# Patient Record
Sex: MALE | Race: WHITE
[De-identification: names, ages, dates, MRNs, and addresses within clinical notes are randomized per-mention and may not be internally consistent; named-entity substitution may affect disease eponyms.]

---

## 2022-07-17 NOTE — XMS
PreManage Notification: NICKOLAS IRENE MRN:[id number]
 
Security Information
 
Security Events
No recent Security Events currently on file
 
 
 
CRITERIA MET
------------
- [facility] - 2 Visits in 30 Days
 
 
CARE PROVIDERS
-------------------------------------------------------------------------------------
[facility],     Clinic/Center: Dental     Current
INC.
 
PHONE: Unknown
-------------------------------------------------------------------------------------
Winnie Torres     Nurse Practitioner: Family     Current
 
PHONE: [phone]
-------------------------------------------------------------------------------------
Laure Rodriguez      Physician Assistant     Current

 
PHONE: [phone]
-------------------------------------------------------------------------------------
 
Sandrita has no Care Guidelines for this patient.
 
E.SHERI VISIT COUNT (12 MO.)
-------------------------------------------------------------------------------------
[address] [address] [address] [facility]
-------------------------------------------------------------------------------------
TOTAL 3
-------------------------------------------------------------------------------------
NOTE: Visits indicate total known visits.
 
ED/UCC VISIT TRACKING (12 MO.)
-------------------------------------------------------------------------------------
07/17/2022 07:13
[facility]
 
TYPE: Emergency
 
COMPLAINT:
- SKIN PROBLEM
-------------------------------------------------------------------------------------
06/30/2022 13:50
[facility] [place]
 
TYPE: Emergency
 
COMPLAINT:
 
- MEDICAL CLEARANCE
 
DIAGNOSES:
- Post-traumatic stress disorder, unspecified
- Other stimulant abuse, uncomplicated
-------------------------------------------------------------------------------------
12/07/2021 13:52
[facility]
 
TYPE: Emergency
 
DIAGNOSES:
- Other stimulant abuse, uncomplicated
- Restlessness and agitation
- Abnormal levels of other serum enzymes
- Drug Problem
-------------------------------------------------------------------------------------
 
 
INPATIENT VISIT TRACKING (12 MO.)
No inpatient visits to display in this time frame
 
[email or web address]

## 2022-07-30 ENCOUNTER — HOSPITAL ENCOUNTER (OUTPATIENT)
Dept: HOSPITAL 46 - ED | Age: 32
Setting detail: OBSERVATION
LOS: 2 days | Discharge: HOME | End: 2022-08-01
Attending: SURGERY | Admitting: SURGERY
Payer: MEDICAID

## 2022-07-30 VITALS — HEIGHT: 66 IN | WEIGHT: 179.46 LBS | BODY MASS INDEX: 28.84 KG/M2

## 2022-07-30 DIAGNOSIS — F15.10: ICD-10-CM

## 2022-07-30 DIAGNOSIS — F43.10: ICD-10-CM

## 2022-07-30 DIAGNOSIS — Z20.822: ICD-10-CM

## 2022-07-30 DIAGNOSIS — K35.33: Primary | ICD-10-CM
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
## 2022-07-30 PROCEDURE — U0003 INFECTIOUS AGENT DETECTION BY NUCLEIC ACID (DNA OR RNA); SEVERE ACUTE RESPIRATORY SYNDROME CORONAVIRUS 2 (SARS-COV-2) (CORONAVIRUS DISEASE [COVID-19]), AMPLIFIED PROBE TECHNIQUE, MAKING USE OF HIGH THROUGHPUT TECHNOLOGIES AS DESCRIBED BY CMS-2020-01-R: HCPCS

## 2022-07-30 PROCEDURE — G0378 HOSPITAL OBSERVATION PER HR: HCPCS

## 2022-07-30 PROCEDURE — A9270 NON-COVERED ITEM OR SERVICE: HCPCS

## 2022-07-30 PROCEDURE — C9803 HOPD COVID-19 SPEC COLLECT: HCPCS

## 2022-07-30 NOTE — XMS
PreManage Notification: NICKOLAS IRENE MRN:[id number]
 
Security Information
 
Security Events
No recent Security Events currently on file
 
 
 
CRITERIA MET
------------
- [facility] - 2 Visits in 30 Days
 
 
CARE PROVIDERS
-------------------------------------------------------------------------------------
[facility],     Clinic/Center: Dental     Current
INC.
 
PHONE: Unknown
-------------------------------------------------------------------------------------
Winnie Torres     Nurse Practitioner: Family     Current
 
PHONE: [phone]
-------------------------------------------------------------------------------------
Laure Rodriguez      Physician Assistant     Current

 
PHONE: [phone]
-------------------------------------------------------------------------------------
 
Sandrita has no Care Guidelines for this patient.
 
E.SHERI VISIT COUNT (12 MO.)
-------------------------------------------------------------------------------------
[address] [address] [facility]
-------------------------------------------------------------------------------------
TOTAL 4
-------------------------------------------------------------------------------------
NOTE: Visits indicate total known visits.
 
ED/UCC VISIT TRACKING (12 MO.)
-------------------------------------------------------------------------------------
07/30/2022 20:50
[facility] [place]
 
TYPE: Emergency
 
COMPLAINT:
- ABD PAIN
-------------------------------------------------------------------------------------
07/17/2022 07:13
[facility]
 
TYPE: Emergency
 
COMPLAINT:
 
- SKIN PROBLEM
 
DIAGNOSES:
- Scabies
- Pruritus, unspecified
-------------------------------------------------------------------------------------
06/30/2022 13:50
[facility] [place]
 
TYPE: Emergency
 
COMPLAINT:
- MEDICAL CLEARANCE
 
DIAGNOSES:
- Post-traumatic stress disorder, unspecified
- Other stimulant abuse, uncomplicated
-------------------------------------------------------------------------------------
12/07/2021 13:52
[facility]
 
TYPE: Emergency
 
DIAGNOSES:
- Other stimulant abuse, uncomplicated
- Restlessness and agitation
- Abnormal levels of other serum enzymes
- Drug Problem
-------------------------------------------------------------------------------------
 
 
INPATIENT VISIT TRACKING (12 MO.)
No inpatient visits to display in this time frame
 
[email or web address]

## 2022-07-31 PROCEDURE — 0DTJ4ZZ RESECTION OF APPENDIX, PERCUTANEOUS ENDOSCOPIC APPROACH: ICD-10-PCS | Performed by: SURGERY

## 2022-07-31 NOTE — NUR
Patient has had uneventful shift. Arrived to floor C/O lower, mid abd pain.
Treated effectively w/4mg PRN morphine. Has been asleep since administration.
Vital signs stable. Remains on RA. Infusing LR at rate of 85 through IV in
RUE. Requested pt to call for assistance getting out of bed due to pain
medication use. No nausea or vomitting. Has remained NPO. Bed alarm in place.
Patient currently asleep with call light within reach.

## 2022-07-31 NOTE — NUR
VERBAL ORDER FOR LR BOLUS 1000ML TO INFUSE NOW, STARTED. SCDS IN PLACE FOR
SURGERY, PT DENEIS OTHER NEEDS AT THIS TIME.

## 2022-07-31 NOTE — NUR
PT WITH PAIN 8/10 WITH MOVEMENT, PAIN MEDICATION UNAVAILABLE AT THIS TIME.
CALL PLACED TO DR. PORRAS, VOICE MESSAGE LEFT FOR RETURN CALL. PT UP TO
BATHROOM, VOIDING WITHOUT EDIFFICULTY. ICE PACK PROVIDED FOR ABD. PT DENIES
OTHER NEEDS AT THIS TIME.

## 2022-07-31 NOTE — NUR
PATIENT BACK FROM SURGERY, VSS, SEEMS TO BE RESTING COMFORTABLY.  X3 LAP SITES
ARE INTACT WITH STERI STRIPS AND SCANT DRAINAGE.  SCD'S ARE ON AND PATIENT IS
SIPPING ON ICE WATER.  ASSESSMENT DONE.

## 2022-07-31 NOTE — NUR
PT RESTING IN BED. PT COMPLAINT OF PAIN 7/10 TO ABD, REQUESTING PAIN
MEDICATION, GIVEN 4MG IV MORPHINE. PT ALERT AND ORIENTED. PT ON ROOM AIR, LUNG
SOUNDS CLEAR, DENIES SOB. BOWEL TONES ACTIVE, DENIES NAUSEA AT THIS TIME, NPO
FOR SURGERY TODAY. CMS INTACT, WITHOUT EDEMA. IV FLUIDS INFUSING LR AT 85
ML/HR TO RIGHT UPPER ARM. CNA IN ROOM WITH PT TO COMPLETE SURGICAL WIPE DOWN.
DISCUSSED PLAN OF CARE FOR THE DAY, PT DENIES OTHER NEEDS AT THIS TIME.

## 2022-07-31 NOTE — NUR
PT ASSISTED TO BATHROOM AND BACK TO BED. PT WITH SMALL AMOUNT OF BLEEDING FROM
UMBILICAL LAP SITE, REINFORCED WITH GAUZE AND TAPE. VSS. PT TOLERATING DIET,
BOWEL TONES ACTIVE. PT CONTINUES TO HAVE PAIN, DOES NOT FEEL TORADOL WAS
HELPFUL.

## 2022-07-31 NOTE — NUR
Patient in bed resting comfortably. Denies needs at this time. Patient is
voiding. Patient having difficulty w/voids, but states this is normal for him.
Call light within reach.

## 2022-07-31 NOTE — NUR
PATIENT IN BED, VITALS WERE DONE BY NURSE MARQUIS, I/O'S COMPLETED. NO OTHER
NEEDS AT THIS TIME. CALL LIGHT IN REACH.

## 2022-07-31 NOTE — NUR
07/31/22 1210 Kerri Lange
1204- PT ARRIVES TO PACU REACTIVE TO VERBAL STIMULI. PT UPDATED THAT
HIS SURGERY IS OVER AND HE IS JUST WAKING UP. PT NODS HIS HEAD AND
FALLS BACK TO SLEEP. RESP EVEN AND UNLABORED. OXYGEN SAT HIGH 90'S TO
100% ON 6L VIA MASK.

## 2022-07-31 NOTE — NUR
PT HAD LAP APPY TODAY. PT ON ROOM AIR, LUNG SOUNDS CLEAR. PT PAIN INITIALLY
HIGH AFTER SURGERY, IMPOVING AFTER BUPRENORPHINE. PT TOLERATING REGULAR DIET,
BOWEL TONES ACTIVE. VOIDING WITHOUT DIFFICULTY. IV FLUIDS INFUSING LR AT
125ML/HR. LAP SITES X3, SMALL AMOUNT OF BLEEDING FROM UMBILICAL SITE,
REINFORCED WITH GAUZE.

## 2022-07-31 NOTE — NUR
PATIENT IN BED, PRE-OP [place] COMPLETE.VITALS AND I/O'S COMPLETE. COMPLAINTS
OF PAIN, NURSE MARQUIS IN ROOM FOR COMPLAINT. NO OTHER NEEDS AT THIS TIME.
CALL LIGHT WITHIN REACH.

## 2022-07-31 NOTE — NUR
Patient asleep in bed. LR running at rate of 85 through RUE IV. Bed alarm in
place. Call light within reach.

## 2022-07-31 NOTE — NUR
PT RESTING IN BED. PT RATING PAIN 7/10, REQUESTING PAIN MEDICATION, GIVEN IV
TORADOL PER ORDER. PT REQUESTING TO HAVE REGULAR FOOD, KITCHEN CALLED. BOWEL
TONES ACTIVE, DENIES NAUSEA. LAP SITE X3 WITH SCANT DRAINAGE, STERISTRIPS IN
PLACE. PT DENIES OTHER NEEDS AT THIS TIME.

## 2022-08-01 NOTE — NUR
PT SITTING UP IN CHAIR ON PHONE WITH TREATMENT CENTER. ADMINISTERED TYLENOL.
SAVING LUNCH FOR LATER AS HIS STOMACH HURTS RIGHT NOW. WILL CHECK LATER FOR A
WALK IN [place], PT AWARE HE NEEDS TO.

## 2022-08-01 NOTE — OR
[facility]
                                    [address], [place]  [address]
_________________________________________________________________________________________
                                                                 Signed   
 
 
DATE OF OPERATION:
07/31/2022
 
SURGEON:
Sahra Porras MD
 
PREOPERATIVE DIAGNOSES:
1. Acute appendicitis.
2. Substance abuse disorder.
 
POSTOPERATIVE DIAGNOSES:
1. Acute appendicitis.
2. Substance abuse disorder.
 
PROCEDURE:
Laparoscopic appendectomy.
 
ANESTHESIA:
1. General endotracheal, Luke Melvin, CRNA.  Preoperative TAP and rectus sheath block
(0.5% ropivacaine, 15 mg Precedex). 
2. Local anesthetic 20 mL of 0.25% Marcaine with epinephrine.
 
INDICATION:
This 31-year-old white man, who is a substance abuse rehab program locally and has
achieved 25 days of sobriety from methamphetamine, fentanyl, and heroin.  He is in a
90-day program.  For the past several days, he has had increasing abdominal pain,
crampiness, and so forth and developed rather severe right lower abdominal pain.  He
presented to the emergency room late last night, was evaluated and found to have
probable appendicitis.  His white count was elevated to 15.7.  A CT scan performed
confirmed acute appendicitis.  He has been treated with Unasyn __________ IV fluid
resuscitation and so forth and is now to undergo appendectomy, preferably by
laparoscopic approach.  He understands the risks of bleeding, infection, need for open
procedure and other unforeseen complications related to operation and wished to proceed. 
 
FINDINGS:
There is no sign of ascites or carcinomatosis.  The gallbladder was normal, though
distended.  The liver was normal.  The appendix was definitely inflamed acutely.  There
was no evidence of perforation or abscess.  Complete appendectomy was performed without
problem.  There was no sign of bleeding. 
 
DESCRIPTION OF PROCEDURE:
The patient was brought to the operating room after undergoing a TAP and rectus sheath
 
    Electronically Signed By: SAHRA PORRAS MD  08/01/22 1544
_________________________________________________________________________________________
PATIENT NAME:     NICKOLAS IRENE                     
MEDICAL RECORD #: [id number]            OPERATIVE REPORT              
          ACCT #: [id number]  
DATE OF BIRTH:   [birth year]            REPORT #: [id number]      
PHYSICIAN:        SAHRA PORRAS MD                 
PCP:              NO PRIMARY CARE PHYSICIAN     
REPORT IS CONFIDENTIAL AND NOT TO BE RELEASED WITHOUT AUTHORIZATION
 
 
                                  [facility]
                                    [address], [place]  [address]
_________________________________________________________________________________________
                                                                 Signed   
 
 
block by the anesthetist in the preop staging area.  He was given a general endotracheal
anesthetic without complication.  Unasyn antibiotic had been administered.  Sequential
compression device stockings were used.  The abdomen was clipped and prepared with a
chlorhexidine solution after endotracheal anesthesia was induced.  The abdomen was
prepared with a chlorhexidine solution and draped sterilely.  0.25% Marcaine with
epinephrine was injected in the subdermal space and fascia in the infraumbilical area
and a vertical incision made inferior to the umbilicus.  Using an open Jas cannula
technique, the abdomen was entered and pneumoperitoneum was achieved to a level of 14
mmHg of carbon dioxide gas.  Intra-abdominal inspection showed no sign of ascites or
carcinomatosis.  The appendix was obscured from view.  The gallbladder was slightly
distended, but not acutely inflamed by any means and the liver was normal. 
 
Similar local anesthetic was injected in the epigastric area above an elaborate tattoo
of his abdomen.  A vertical incision was made in the epigastric area and under direct
visualization, a 10 mm port placed in that site.  The camera was placed to that site.
The table was turned left side down and manipulation was single hand revealed the cecum
and some surrounding omentum.  The appendix was easily visualized and markedly inflamed
but not perforated.  There was no abscess.  A right lower quadrant incision was made
after local anesthetic injected and a 5 mm trocar placed.  With two-hand manipulation,
the appendix could be manipulated that was not excessively long, was very thickened and
inflamed.  A window was created between the appendix and the mesoappendix and the cecum.
 Using a JAMEE stapling device, the base of the appendix was transected flushed with the
cecum.  This allowed for elevation of the mesoappendix, which was similarly transected
with a vascular load also.  There was good hemostasis.  The appendix did not easily
withdraw into the infraumbilical port and therefore an endobag was used and extracted it
fully without contact to the subcutaneous space.  Irrigation was undertaken in the right
lower quadrant.  There was no sign of ongoing bleeding or other problem.  Staple lines
were hemostatic.  Excess irrigation fluid was suctioned free.  The camera was placed to
the infraumbilical site and the epigastric port was placed.  __________ epigastric port
was removed showing no sign of bleeding.  Similarly, the right lower quadrant port was
removed without bleeding.  Pneumoperitoneum was relieved and the infraumbilical fascial
incision was reapproximated with interrupted 0 Vicryl suture.  Additionally, a 10 mL of
0.25% Marcaine was injected in the trocar sites.  The skin was then closed with
interrupted 3-0 Vicryl.  Steri-Strips were applied.  The patient was ultimately
extubated and transferred to the recovery room in good condition having suffered no
complication.  Sponge, needle, and instrument counts were reported as correct x3. 
 
 
 
            ________________________________________
            Sahra Porras MD 
 
 
    Electronically Signed By: SAHRA PORRAS MD  08/01/22 1544
_________________________________________________________________________________________
PATIENT NAME:     NICKOLAS IRENE                     
MEDICAL RECORD #: [id number]            OPERATIVE REPORT              
          ACCT #: [id number]  
DATE OF BIRTH:   [birth year]            REPORT #: [id number]      
PHYSICIAN:        SAHRA PORRAS MD                 
PCP:              NO PRIMARY CARE PHYSICIAN     
REPORT IS CONFIDENTIAL AND NOT TO BE RELEASED WITHOUT AUTHORIZATION
 
 
                                  [address]  [address]
_________________________________________________________________________________________
                                                                 Signed   
 
 
 
/[facility]
Job #:  [id number]
DD:  07/31/2022 12:14:42
DT:  07/31/2022 19:01:23
 
cc:            Dr. Abhijeet Quesada 
               [facility]
 
 
Copies:                                
~
 
 
 
 
 
 
 
 
 
 
 
 
 
 
 
 
 
 
 
 
 
 
 
 
 
 
 
 
 
 
 
    Electronically Signed By: SAHRA PORRAS MD  08/01/22 1544
_________________________________________________________________________________________
PATIENT NAME:     NICKOLAS IRENE                     
MEDICAL RECORD #: [id number]            OPERATIVE REPORT              
          ACCT #: [id number]  
DATE OF BIRTH:   [birth year]            REPORT #: [id number]      
PHYSICIAN:        SAHRA PORRAS MD                 
PCP:              NO PRIMARY CARE PHYSICIAN     
REPORT IS CONFIDENTIAL AND NOT TO BE RELEASED WITHOUT AUTHORIZATION

## 2022-08-01 NOTE — NUR
REPORT RECIEVED FROM ROHAN THORNE. PT AWAKE AND SITTING UP IN BED. STATES HE IS
FEELING OK. ELADIO BOLDEN IN ROOM TO ASSIST PT TO USE RESTROOM.

## 2022-08-01 NOTE — NUR
ADMINISTERED MORNING MEDS ALONG WITH MOTRIN. PT TOLERATING REGULAR FOOD FINE
AND ASKED WHEN BREAKFAST WOULD BE HERE. RATES PAIN 7/10 SITTING UP IN CHAIR
WATCHING TV. DENIES NAUSEA.

## 2022-08-01 NOTE — NUR
PT CALLING Q15 MINUTES FOR PHONE NUMBERS AND INFORMATION ABOUT DISCHARGE AND
TRANSPORT AND [place] AND ...GIVEN PAMPHLETS ON LOCAL ASSISTANCE.

## 2022-08-01 NOTE — NUR
Patient was having difficulty sleeping. Did get to sleep in early morning
hours. Continuing to infuse LR at rate of 125. Voiding normally (for patient).
Incision steri strips have small amount of shadowing. Ambulates w/SBA in room.
Eating/drinking well.

## 2022-08-01 NOTE — NUR
Patient having difficulty sleeping. Warm blankets provided, lights dimmed, and
noise silenced to promote sleep. Patient has continued to have moderate amount
of abdominal surg site pain. Has been treated w/PRN Tylenol and ketorolac.
Infusing LR at rate of 125. On RA. Call light within reach.

## 2022-08-01 NOTE — HP
[facility]
                                    [address], [place]  [address]
_________________________________________________________________________________________
                                                                 Signed   
 
 
ADMISSION DATE:  
07/30/2022
 
REASON FOR ADMISSION:  
Acute appendicitis.
 
HISTORY OF PRESENT ILLNESS:  
This 31-year-old white man is from [place], [place], and is in an inpatient 90-day
recovery unit for substance abuse (methamphetamine in his case) of which he has been
there approximately 26 days.  He is doing well.  In the past several days, he has had
increasing crampy abdominal pain including pain in the right lower abdomen.  He had
nausea and vomiting in the morning of evaluation, presented to the emergency room at
approximately 11:00 p.m. last night.  He was evaluated by Dr. Quesada and clinically
thought likely to have appendicitis.  On that basis, he underwent an abdominal and
pelvic CT scan at 11:30 last night, was found to have findings consistent with acute
appendicitis.  My review of his images shows a normal-appearing liver and gallbladder
and an obviously dilated and enlarged appendix directed medially and well visualized. 
 
His evaluation in the emergency room included a CBC, which showed a white count of 15.7.
 His other lab studies were normal.  He was admitted for further evaluation and care. 
 
PAST MEDICAL HISTORY:  
Remarkable only for a substance abuse addiction.  This has included methamphetamine, but
also fentanyl and heroin have been with some sort of abuse as well. 
 
SOCIAL HISTORY:  
He has children and previously had a girlfriend.  He was a  prior to his addiction
and treatment. 
 
CURRENT MEDICATIONS:  
Include clonidine 0.1 mg p.o. t.i.d., buprenorphine (Suboxone) 12 mg/3 mg sublingual and
lithium carbonate of an unknown dose. 
 
REVIEW OF SYSTEMS:  
He denies any shortness of breath or chest pain. He had no dysphagia, hematemesis, or
blood per rectum.  His pain is dominantly in the right lower quadrant. 
 
PHYSICAL EXAMINATION:
GENERAL:  He is a pleasant white man, who does not appear to be systemically toxic. 
VITAL SIGNS:  Temperature is 98, pulse is 76, blood pressure 108/63. 
HEENT:  Mucous membranes are dry.  Trachea is midline. 
CHEST:  Clear. 
 
    Electronically Signed By: SAHRA PORRAS MD  08/01/22 1544
_________________________________________________________________________________________
PATIENT NAME:     NICKOLAS IRENE                     
MEDICAL RECORD #: [id number]            HISTORY AND PHYSICAL          
          ACCT #: [id number]  
DATE OF BIRTH:   [birth year]            REPORT #: [id number]      
PHYSICIAN:        SAHRA PORRAS MD                 
PCP:              NO PRIMARY CARE PHYSICIAN     
REPORT IS CONFIDENTIAL AND NOT TO BE RELEASED WITHOUT AUTHORIZATION
 
 
                                  [facility]
                                    [address] [address]
_________________________________________________________________________________________
                                                                 Signed   
 
 
HEART:  Regular without murmur. 
ABDOMEN:  Nondistended.  Rovsing sign is positive.  There is tenderness at McBurney's
point. 
EXTREMITIES:  Show no clubbing, cyanosis, or edema.  He has numerous tattoos.
 
LABORATORY DATA:  
His CBC shows a white count of 15.7, hematocrit 40.8, platelets 150,000. Chem profile is
essentially normal. Creatinine 0.85, total protein 8.4, lipase 40.  Urinalysis was
normal.  PH is 7.5.  Serology is negative for COVID.  I have reviewed a CT scan in
detail and in particular, the coronal view shows quite obviously acute appendicitis and
no other intraabdominal problems that I can see. 
 
ASSESSMENT AND PLAN:  
He has acute appendicitis.  I discussed the pathophysiology of the problem with him and
have made a recommendation for appendectomy.  Given the advanced nature of his disease,
I would not recommend a nonoperative approach.  He has already been started on Unasyn
antibiotic.  The risks of bleeding, infection, need for open procedure, need for other
indicated procedures and so forth were reviewed in detail.  He understands and wished to
proceed. 
 
 
 
            ________________________________________
            MD ANU Sandoval/ADELINEL
Job #:  [id number]
DD:  07/31/2022 10:09:37
DT:  07/31/2022 18:31:20
 
cc:            Abhijeet Quesada MD 
               [facility]
 
 
Copies:                                
~
 
 
 
 
 
 
    Electronically Signed By: SAHRA PORRAS MD  08/01/22 1544
_________________________________________________________________________________________
PATIENT NAME:     NICKOLAS IRENE                     
MEDICAL RECORD #: [id number]            HISTORY AND PHYSICAL          
          ACCT #: [id number]  
DATE OF BIRTH:   [birth year]            REPORT #: [id number]      
PHYSICIAN:        SAHRA PORRAS MD                 
PCP:              NO PRIMARY CARE PHYSICIAN     
REPORT IS CONFIDENTIAL AND NOT TO BE RELEASED WITHOUT AUTHORIZATION

## 2022-08-03 NOTE — PATH
[facility]
                                    [address]
                                  [place], [place]  [address]
_________________________________________________________________________________________
                                                                 Signed   
 
 
 
SPECIMEN(S): A APPENDIX, OTHER THAN INCIDENTAL
 
SPECIMEN SOURCE:
A. APPENDIX, OTHER THAN INCIDENTAL
 
CLINICAL HISTORY:
Acute appendicitis.
 
FINAL PATHOLOGIC DIAGNOSIS:
Appendix, appendectomy:
-  Acute suppurative appendicitis with periappendicitis and serositis.
JVR:dionne:C2NR
 
MICROSCOPIC EXAMINATION:
Histologic sections of all submitted blocks are examined by light microscopy.  
These findings, together with the gross examination, support the pathologic 
diagnosis. 
 
GROSS DESCRIPTION:
The specimen, labeled "IB, appendix," is received in formalin and consists of
Specimen: Appendix with mesoappendix.
Dimensions: 5.5 x 0.8 cm.
Serosa: Violaceous and focally congested.
Defect: Not grossly identified.
Inking: Staple line is inked black.
Mucosa: Pink-tan.
Fecalith: Not grossly identified.
Additional: None.
Representative sections are submitted in cassette (A1).
JS (under the direct supervision of a pathologist)
The Gross Description was prepared using a voice recognition system. The report 
was reviewed for accuracy; however, sound-alike word errors, addition and/or 
deletions may occur. If there is any 
question about this report, please contact Client Services.
 
PERFORMING LABORATORY:
The technical component was performed by [facility], [address] [place] [id number] (CLIA# [id number]).   Professional interpretation was 
performed by [facility], [address] [place] [address], [place] [address] (CLIA#: [id number]).
 
 
                                                                                    
_________________________________________________________________________________________
PATIENT NAME:     NICKOLAS IRENE                     
MEDICAL RECORD #: [id number]            PATHOLOGY                     
          ACCT #: [id number]       ACCESSION #: [id number]     
DATE OF BIRTH:   [birth year]            REPORT #: [id number]       
PHYSICIAN:        [facility]              
PCP:              NO PRIMARY CARE PHYSICIAN     
REPORT IS CONFIDENTIAL AND NOT TO BE RELEASED WITHOUT AUTHORIZATION
 
 
                                  [facility]
                                    [address] [place] [address]
                                  [place], [place]  [address]
_________________________________________________________________________________________
                                                                 Signed   
 
 
Diagnostician:  Randell Mcfarlane MD
Pathologist
Electronically Signed 08/03/2022
 
 
Copies:                                
~
 
 
 
 
 
 
 
 
 
 
 
 
 
 
 
 
 
 
 
 
 
 
 
 
 
 
 
 
 
 
 
 
 
 
 
 
                                                                                    
_________________________________________________________________________________________
PATIENT NAME:     NIKCOLAS IRENE                     
MEDICAL RECORD #: [id number]            PATHOLOGY                     
          ACCT #: [id number]       ACCESSION #: [id number]     
DATE OF BIRTH:   [birth year]            REPORT #: [id number]       
PHYSICIAN:        [facility]              
PCP:              NO PRIMARY CARE PHYSICIAN     
REPORT IS CONFIDENTIAL AND NOT TO BE RELEASED WITHOUT AUTHORIZATION